# Patient Record
Sex: FEMALE | Race: WHITE
[De-identification: names, ages, dates, MRNs, and addresses within clinical notes are randomized per-mention and may not be internally consistent; named-entity substitution may affect disease eponyms.]

---

## 2020-05-29 PROBLEM — Z00.00 ENCOUNTER FOR PREVENTIVE HEALTH EXAMINATION: Status: ACTIVE | Noted: 2020-05-29

## 2020-06-01 ENCOUNTER — APPOINTMENT (OUTPATIENT)
Dept: ULTRASOUND IMAGING | Facility: CLINIC | Age: 45
End: 2020-06-01

## 2020-06-01 ENCOUNTER — OUTPATIENT (OUTPATIENT)
Dept: OUTPATIENT SERVICES | Facility: HOSPITAL | Age: 45
LOS: 1 days | End: 2020-06-01
Payer: COMMERCIAL

## 2020-06-01 PROCEDURE — 76856 US EXAM PELVIC COMPLETE: CPT | Mod: 26

## 2020-06-01 PROCEDURE — 76830 TRANSVAGINAL US NON-OB: CPT | Mod: 26

## 2022-06-16 ENCOUNTER — APPOINTMENT (OUTPATIENT)
Dept: OTOLARYNGOLOGY | Facility: CLINIC | Age: 47
End: 2022-06-16

## 2022-07-19 ENCOUNTER — APPOINTMENT (OUTPATIENT)
Dept: OTOLARYNGOLOGY | Facility: CLINIC | Age: 47
End: 2022-07-19

## 2022-07-21 ENCOUNTER — APPOINTMENT (OUTPATIENT)
Dept: OTOLARYNGOLOGY | Facility: CLINIC | Age: 47
End: 2022-07-21

## 2022-07-21 VITALS — WEIGHT: 140 LBS | TEMPERATURE: 98.1 F | BODY MASS INDEX: 22.5 KG/M2 | HEIGHT: 66 IN

## 2022-07-21 DIAGNOSIS — Z72.89 OTHER PROBLEMS RELATED TO LIFESTYLE: ICD-10-CM

## 2022-07-21 DIAGNOSIS — Z85.828 PERSONAL HISTORY OF OTHER MALIGNANT NEOPLASM OF SKIN: ICD-10-CM

## 2022-07-21 DIAGNOSIS — R42 DIZZINESS AND GIDDINESS: ICD-10-CM

## 2022-07-21 DIAGNOSIS — Z82.61 FAMILY HISTORY OF ARTHRITIS: ICD-10-CM

## 2022-07-21 DIAGNOSIS — Z87.891 PERSONAL HISTORY OF NICOTINE DEPENDENCE: ICD-10-CM

## 2022-07-21 DIAGNOSIS — Z78.9 OTHER SPECIFIED HEALTH STATUS: ICD-10-CM

## 2022-07-21 DIAGNOSIS — H93.293 OTHER ABNORMAL AUDITORY PERCEPTIONS, BILATERAL: ICD-10-CM

## 2022-07-21 PROCEDURE — 99203 OFFICE O/P NEW LOW 30 MIN: CPT

## 2022-07-21 PROCEDURE — 92557 COMPREHENSIVE HEARING TEST: CPT

## 2022-07-21 PROCEDURE — 92567 TYMPANOMETRY: CPT

## 2022-07-21 RX ORDER — SODIUM PICOSULFATE, MAGNESIUM OXIDE, AND ANHYDROUS CITRIC ACID 10; 3.5; 12 MG/160ML; G/160ML; G/160ML
10-3.5-12 MG-GM LIQUID ORAL
Qty: 320 | Refills: 0 | Status: ACTIVE | COMMUNITY
Start: 2022-02-07

## 2022-07-21 NOTE — ASSESSMENT
[FreeTextEntry1] : She has a history of dizziness.  She describes a heavy feeling and a sensation of feeling hung over as well as disequilibrium.  She does not have vertigo.  Her ear exam and audiogram were normal.  We discussed possible etiologies such as peripheral vestibulopathy, atypical migraines, hormonal changes, other neurological sources including lesions.\par \par PLAN\par \par -findings and management options discussed in detail with the patient. \par -good aural hygiene\par -avoid using cotton swabs in the ears\par -wax removal drops as needed. \par -noise precautions\par -annual audiogram\par -She may try vestibular therapy and/your home exercises\par -I asked her to speak with her ophthalmologist to see if she could have eyestrain.\par -I recommended neurology evaluation.  She will also speak with her GYN to see if her symptoms could be due to hormonal changes\par -Consider specialized inner ear testing depending on how she does\par -I asked her to call me with an update after she undergoes the evaluations.\par -call and return earlier if any concerns.

## 2022-07-21 NOTE — CONSULT LETTER
[Dear  ___] : Dear  [unfilled], [Consult Letter:] : I had the pleasure of evaluating your patient, [unfilled]. [Please see my note below.] : Please see my note below. [Consult Closing:] : Thank you very much for allowing me to participate in the care of this patient.  If you have any questions, please do not hesitate to contact me. [Sincerely,] : Sincerely, [FreeTextEntry3] : Starla March MD\par

## 2022-07-21 NOTE — HISTORY OF PRESENT ILLNESS
[de-identified] : ANNY STARK is a 47 year old patient referred by Dr. Martin for 6-month history of dizziness.  She says it is mostly all the time.  It occurs more than once a day and last several hours.  She feels off balance and has lightheadedness.  She does not have positional vertigo.  She does not describe a spinning sensation.  She does notice it when she is sitting in front of the computer.  She said when she was away for a week she did feel better.  She has no tinnitus, otalgia, otorrhea, or change in her hearing.  She describes a sensation of feeling hung over and dehydrated with a heavy feeling.  \par \par No history of recurrent middle ear infections, prior otologic surgery, ear trauma, or excessive noise exposure.  She has not had a recent audiogram\par \par She did not recall a preceding event but it did start around the time that she did renovation her apartment.  She was concerned that she could be allergic to the new floor but she did not have any nasal congestion, nasal obstruction, nasal drainage.  She also use a vibrating massager on her neck.  She does have a history of chronic left neck pain for many years.  She has not had it evaluated.  She did not have any muscle weakness, visual changes, or other neurological symptoms.\par \par She denies a history of migraines.  She is perimenopausal

## 2022-07-22 PROBLEM — H93.293 ABNORMAL AUDITORY PERCEPTION OF BOTH EARS: Status: ACTIVE | Noted: 2022-07-22
